# Patient Record
Sex: FEMALE | Race: WHITE | NOT HISPANIC OR LATINO
[De-identification: names, ages, dates, MRNs, and addresses within clinical notes are randomized per-mention and may not be internally consistent; named-entity substitution may affect disease eponyms.]

---
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
## 2024-03-03 PROBLEM — Z20.822 EXPOSURE TO COVID-19 VIRUS: Status: RESOLVED | Noted: 2021-03-10 | Resolved: 2024-03-03

## 2024-03-04 ENCOUNTER — APPOINTMENT (OUTPATIENT)
Dept: PEDIATRICS | Facility: CLINIC | Age: 19
End: 2024-03-04
Payer: COMMERCIAL

## 2024-03-04 VITALS
WEIGHT: 141 LBS | HEIGHT: 67 IN | HEART RATE: 78 BPM | SYSTOLIC BLOOD PRESSURE: 110 MMHG | TEMPERATURE: 97.6 F | BODY MASS INDEX: 22.13 KG/M2 | DIASTOLIC BLOOD PRESSURE: 68 MMHG | RESPIRATION RATE: 18 BRPM

## 2024-03-04 DIAGNOSIS — L30.9 DERMATITIS, UNSPECIFIED: ICD-10-CM

## 2024-03-04 DIAGNOSIS — Z00.00 ENCOUNTER FOR GENERAL ADULT MEDICAL EXAMINATION W/OUT ABNORMAL FINDINGS: ICD-10-CM

## 2024-03-04 DIAGNOSIS — Z20.822 CONTACT WITH AND (SUSPECTED) EXPOSURE TO COVID-19: ICD-10-CM

## 2024-03-04 PROCEDURE — 96160 PT-FOCUSED HLTH RISK ASSMT: CPT | Mod: 59

## 2024-03-04 PROCEDURE — 99395 PREV VISIT EST AGE 18-39: CPT

## 2024-03-04 PROCEDURE — 92551 PURE TONE HEARING TEST AIR: CPT

## 2024-03-04 PROCEDURE — 99173 VISUAL ACUITY SCREEN: CPT | Mod: 59

## 2024-03-04 RX ORDER — DUPILUMAB 300 MG/2ML
300 INJECTION, SOLUTION SUBCUTANEOUS
Refills: 0 | Status: ACTIVE | COMMUNITY

## 2024-03-04 NOTE — RISK ASSESSMENT
[0] : 1) Little interest or pleasure doing things: Not at all (0) [No Increased risk of SCA or SCD] : No Increased risk of SCA or SCD    [Have you ever fainted, passed out or had an unexplained seizure suddenly and without warning, especially during exercise or in response] : Have you ever fainted, passed out or had an unexplained seizure suddenly and without warning, especially during exercise or in response to sudden loud noises such as doorbells, alarm clocks and ringing telephones? No [Has anyone in your immediate family (parents, grandparents, siblings) or other more distant relatives (aunts, uncles, cousins)  of heart] : Has anyone in your immediate family (parents, grandparents, siblings) or other more distant relatives (aunts, uncles, cousins)  of heart problems or had an unexpected sudden death before age 50 (This would include unexpected drownings, unexplained car accidents in which the relative was driving or sudden infant death syndrome.)? No [Have you ever had exercise-related chest pain or shortness of breath?] : Have you ever had exercise-related chest pain or shortness of breath? No [Are you related to anyone with hypertrophic cardiomyopathy or hypertrophic obstructive cardiomyopathy, Marfan syndrome, arrhythmogenic] : Are you related to anyone with hypertrophic cardiomyopathy or hypertrophic obstructive cardiomyopathy, Marfan syndrome, arrhythmogenic right ventricular cardiomyopathy, long QT syndrome, short QT syndrome, Brugada syndrome or catecholaminergic polymorphic ventricular tachycardia, or anyone younger than 50 years with a pacemaker or implantable defibrillator? No

## 2024-03-04 NOTE — DISCUSSION/SUMMARY
[FreeTextEntry1] : THis is a adolescent patient here for routine exam .I  have recommended that the patient participates in 60 minutes or more of physical activity a day.   I explained that it is important to limit screen time to less than 2 hrs a day. Patients diet was discussed and advice given on how to maintain good healthy caloric intake . Physical exam is within normal limits .  Has generalized eczema recent flareup for which she was started on Dupixent.  Patient states doing much better.  Discussed diet prior to flareup has been following a gluten-free diet then went away at school diet became more varied.  Discussed possibility of going back on gluten-free diet as well as dairy free to see if this controls her outbreaks.   Immunizations were discussed patient wishes to defer to member as well as HPV.  Notified that Adacel needs to be given prior to going away to school this summer in [place].  Will follow-up for vaccine prior to travel.  Patient to follow up in 1 year for routine exam  Craft Screen-     PASSED

## 2024-03-04 NOTE — PHYSICAL EXAM
[Alert] : alert [Normocephalic] : normocephalic [No Acute Distress] : no acute distress [Clear tympanic membranes with bony landmarks and light reflex present bilaterally] : clear tympanic membranes with bony landmarks and light reflex present bilaterally  [EOMI Bilateral] : EOMI bilateral [Pink Nasal Mucosa] : pink nasal mucosa [Nonerythematous Oropharynx] : nonerythematous oropharynx [No Palpable Masses] : no palpable masses [Supple, full passive range of motion] : supple, full passive range of motion [Regular Rate and Rhythm] : regular rate and rhythm [Clear to Auscultation Bilaterally] : clear to auscultation bilaterally [Normal S1, S2 audible] : normal S1, S2 audible [No Murmurs] : no murmurs [Soft] : soft [+2 Femoral Pulses] : +2 femoral pulses [Non Distended] : non distended [NonTender] : non tender [No Hepatomegaly] : no hepatomegaly [Normoactive Bowel Sounds] : normoactive bowel sounds [No Splenomegaly] : no splenomegaly [Aneesh: ____] : Aneesh [unfilled] [No Abnormal Lymph Nodes Palpated] : no abnormal lymph nodes palpated [Aneesh: _____] : Aneesh [unfilled] [No Gait Asymmetry] : no gait asymmetry [Normal Muscle Tone] : normal muscle tone [No pain or deformities with palpation of bone, muscles, joints] : no pain or deformities with palpation of bone, muscles, joints [Straight] : straight [Cranial Nerves Grossly Intact] : cranial nerves grossly intact [+2 Patella DTR] : +2 patella DTR [de-identified] : Generalized eczema being followed by Derm and treated with Dupixent.

## 2024-03-04 NOTE — HISTORY OF PRESENT ILLNESS
[Mother] : mother [Yes] : Patient goes to dentist yearly [Up to date] : Up to date [Normal] : normal [Cycle Length: _____ days] : Cycle Length: [unfilled] days [Days of Bleeding: _____] : Days of bleeding: [unfilled] [Eats meals with family] : eats meals with family [Is permitted and is able to make independent decisions] : Is permitted and is able to make independent decisions [Eats regular meals including adequate fruits and vegetables] : eats regular meals including adequate fruits and vegetables [Drinks non-sweetened liquids] : drinks non-sweetened liquids  [Has friends] : has friends [At least 1 hour of physical activity a day] : at least 1 hour of physical activity a day [Has ways to cope with stress] : has ways to cope with stress [Has interests/participates in community activities/volunteers] : has interests/participates in community activities/volunteers. [Displays self-confidence] : displays self-confidence [Sleep Concerns] : no sleep concerns [Normal Performance] : normal performance [Normal Homework] : normal homework [Normal Behavior/Attention] : normal behavior/attention [Uses electronic nicotine delivery system] : does not use electronic nicotine delivery system [Screen time (except homework) less than 2 hours a day] : no screen time (except homework) less than 2 hours a day [Uses drugs] : does not use drugs  [Uses tobacco] : does not use tobacco [Drinks alcohol] : drinks alcohol [Has problems with sleep] : does not have problems with sleep [No] : Patient has not had sexual intercourse. [Has thought about hurting self or considered suicide] : has not thought about hurting self or considered suicide [Gets depressed, anxious, or irritable/has mood swings] : does not get depressed, anxious, or irritable/has mood swings [de-identified] : [employer] honor student studying psychology [de-identified] : walk ,yoga. [de-identified] : Social drinker discussed [de-identified] : on BCP for poc [FreeTextEntry1] : This is a 19 year F here for routine exam   followed by Derm for eczema, on Dupixent.  Receives by monthly injections appears to be doing well.  Still has flareups on and off however

## 2024-04-01 ENCOUNTER — NON-APPOINTMENT (OUTPATIENT)
Age: 19
End: 2024-04-01

## 2024-04-01 LAB
ALBUMIN SERPL ELPH-MCNC: 4.5 G/DL
ALP BLD-CCNC: 52 U/L
ALT SERPL-CCNC: 13 U/L
ANION GAP SERPL CALC-SCNC: 13 MMOL/L
APPEARANCE: CLEAR
AST SERPL-CCNC: 15 U/L
BACTERIA: NEGATIVE /HPF
BASOPHILS # BLD AUTO: 0.04 K/UL
BASOPHILS NFR BLD AUTO: 0.7 %
BILIRUB SERPL-MCNC: 0.3 MG/DL
BILIRUBIN URINE: NEGATIVE
BLOOD URINE: ABNORMAL
BUN SERPL-MCNC: 11 MG/DL
CALCIUM SERPL-MCNC: 9.4 MG/DL
CAST: 0 /LPF
CHLORIDE SERPL-SCNC: 104 MMOL/L
CHOLEST SERPL-MCNC: 129 MG/DL
CO2 SERPL-SCNC: 24 MMOL/L
COLOR: YELLOW
CREAT SERPL-MCNC: 0.75 MG/DL
EGFR: 117 ML/MIN/1.73M2
EOSINOPHIL # BLD AUTO: 0.11 K/UL
EOSINOPHIL NFR BLD AUTO: 2 %
EPITHELIAL CELLS: 9 /HPF
GLUCOSE QUALITATIVE U: NEGATIVE MG/DL
GLUCOSE SERPL-MCNC: 94 MG/DL
HCT VFR BLD CALC: 43.5 %
HDLC SERPL-MCNC: 50 MG/DL
HGB BLD-MCNC: 14.5 G/DL
IMM GRANULOCYTES NFR BLD AUTO: 0.2 %
KETONES URINE: NEGATIVE MG/DL
LDLC SERPL CALC-MCNC: 62 MG/DL
LEUKOCYTE ESTERASE URINE: NEGATIVE
LYMPHOCYTES # BLD AUTO: 2.09 K/UL
LYMPHOCYTES NFR BLD AUTO: 38.2 %
MAN DIFF?: NORMAL
MCHC RBC-ENTMCNC: 29.1 PG
MCHC RBC-ENTMCNC: 33.3 GM/DL
MCV RBC AUTO: 87.3 FL
MICROSCOPIC-UA: NORMAL
MONOCYTES # BLD AUTO: 0.33 K/UL
MONOCYTES NFR BLD AUTO: 6 %
NEUTROPHILS # BLD AUTO: 2.89 K/UL
NEUTROPHILS NFR BLD AUTO: 52.9 %
NITRITE URINE: NEGATIVE
NONHDLC SERPL-MCNC: 79 MG/DL
PH URINE: 5.5
PLATELET # BLD AUTO: 226 K/UL
POTASSIUM SERPL-SCNC: 4.7 MMOL/L
PROT SERPL-MCNC: 6.9 G/DL
PROTEIN URINE: NEGATIVE MG/DL
RBC # BLD: 4.98 M/UL
RBC # FLD: 12.5 %
RED BLOOD CELLS URINE: 2 /HPF
REVIEW: NORMAL
SODIUM SERPL-SCNC: 141 MMOL/L
SPECIFIC GRAVITY URINE: 1.02
T3 SERPL-MCNC: 165 NG/DL
T4 FREE SERPL-MCNC: 1.1 NG/DL
T4 SERPL-MCNC: 7.4 UG/DL
TRIGL SERPL-MCNC: 89 MG/DL
TSH SERPL-ACNC: 1.03 UIU/ML
UROBILINOGEN URINE: 0.2 MG/DL
WBC # FLD AUTO: 5.47 K/UL
WHITE BLOOD CELLS URINE: 2 /HPF

## 2025-06-09 ENCOUNTER — APPOINTMENT (OUTPATIENT)
Dept: INTERNAL MEDICINE | Facility: CLINIC | Age: 20
End: 2025-06-09